# Patient Record
Sex: FEMALE | Race: WHITE
[De-identification: names, ages, dates, MRNs, and addresses within clinical notes are randomized per-mention and may not be internally consistent; named-entity substitution may affect disease eponyms.]

---

## 2018-02-10 ENCOUNTER — HOSPITAL ENCOUNTER (EMERGENCY)
Dept: HOSPITAL 47 - EC | Age: 58
Discharge: HOME | End: 2018-02-10
Payer: COMMERCIAL

## 2018-02-10 VITALS — TEMPERATURE: 98.3 F | SYSTOLIC BLOOD PRESSURE: 113 MMHG | HEART RATE: 74 BPM | DIASTOLIC BLOOD PRESSURE: 74 MMHG

## 2018-02-10 VITALS — RESPIRATION RATE: 18 BRPM

## 2018-02-10 DIAGNOSIS — Z23: ICD-10-CM

## 2018-02-10 DIAGNOSIS — S61.215A: Primary | ICD-10-CM
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
## 2018-02-10 DIAGNOSIS — Z88.0: ICD-10-CM
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
## 2018-02-10 DIAGNOSIS — Z79.899: ICD-10-CM

## 2018-02-10 DIAGNOSIS — F17.200: ICD-10-CM
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
## 2018-02-10 PROCEDURE — 12001 RPR S/N/AX/GEN/TRNK 2.5CM/<: CPT

## 2018-02-10 PROCEDURE — 99283 EMERGENCY DEPT VISIT LOW MDM: CPT

## 2018-02-10 PROCEDURE — 90471 IMMUNIZATION ADMIN: CPT

## 2018-02-10 PROCEDURE — 90715 TDAP VACCINE 7 YRS/> IM: CPT

## 2018-02-10 NOTE — ED
Wound/Laceration HPI





- General


Chief Complaint: Wound/Laceration


Stated Complaint: Finger injury


Time Seen by Provider: 02/10/18 19:39


Source: patient, RN notes reviewed, old records reviewed


Mode of arrival: ambulatory


Limitations: no limitations





- History of Present Illness


Initial Comments: 





the patient is a 57-year-old female chief complaint of the left fourth distal 

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 

a slicer.  She reports that she cut the distal tip of her fourth finger.  She 

does not know the status of her tetanus vaccine.  She has full range of motion 

noted of the finger.  No fever or chills, numbness or tingling down hand, chest 

pain, shortness breath, nausea or vomiting or headaches.  Patient reports that 

she has run sensation to the distal tip of the finger.





- Related Data


 Home Medications











 Medication  Instructions  Recorded  Confirmed


 


Celecoxib [CeleBREX] 200 mg PO BID 02/10/18 02/10/18


 


Citalopram Hydrobromide [CeleXA] 40 mg PO DAILY 02/10/18 02/10/18


 


Levothyroxine Sodium [Synthroid] 125 mcg PO DAILY 02/10/18 02/10/18











 Allergies











Allergy/AdvReac Type Severity Reaction Status Date / Time


 


Penicillins Allergy  Unknown Verified 02/10/18 19:39





   Childhood  














Review of Systems


ROS Statement: 


Those systems with pertinent positive or pertinent negative responses have been 

documented in the HPI.





ROS Other: All systems not noted in ROS Statement are negative.





Past Medical History


Past Medical History: No Reported History


History of Any Multi-Drug Resistant Organisms: None Reported


Past Surgical History: Cholecystectomy, Hysterectomy


Past Psychological History: No Psychological Hx Reported


Smoking Status: Current every day smoker


Past Alcohol Use History: Rare


Past Drug Use History: None Reported





General Exam





- General Exam Comments


Initial Comments: 





this is a well-appearing 57-year-old female.  No distress.


General: Well appearing, well nourished, in no distress. Oriented x 3, normal 

mood and affect . Ambulating without difficulty. 


Skin: Good turgor, no rash, unusual bruising or prominent lesions 


Hair: Normal texture and distribution. 


HEENT: Head: Normocephalic, atraumatic, no visible or palpable masses, 

depressions, or scaring.


Neck: Supple


Heart: No cardiomegaly or thrills; regular rate and rhythm, no murmur or gallop 


Lungs: Clear to auscultation and percussion 


Extremities: No amputations or deformities, cyanosis, edema or varicosities, 

peripheral pulses intact


patient is a partially a 1 cm flap laceration over the distal tip of the left 

fourth finger.


Musculoskeletal: Normal gait and station. 


Neurologic: CN 2-12 normal. Sensation to pain, touch, and proprioception 

normal.  No pathologic reflexes. 


Psychiatric: Oriented X3, intact recent and remote memory, judgment and insight

, normal mood and affect.


 








Limitations: no limitations





Course


 Vital Signs











  02/10/18 02/10/18





  19:21 20:46


 


Temperature 98.6 F 98.3 F


 


Pulse Rate 65 74


 


Respiratory 18 18





Rate  


 


Blood Pressure 119/68 113/74


 


O2 Sat by Pulse 98 94 L





Oximetry  














Procedures





- Laceration


  ** Laceration #1


Site: other (finger )


Size (cm): 1


Description: linear


Depth: simple, single layer


Anesthetic Used: lidocaine 1%


Anesthesia Technique: local infiltration


Amount (mls): 2


Pre-repair: wound explored, irrigated extensively


Size of Sutures: 6-0


Number of Sutures: 3


Technique: simple, interrupted


Patient Tolerated Procedure: well, no complications





Medical Decision Making





- Medical Decision Making





57-year-old female chief complaint of a left fourth finger distal tip 

laceration.  She cut on a slicer at work.  She has full range of motion.  

Laceration is superficial.  With continued to bleed.  I did numb the area and 

cleaned with iodine and normal saline.  Patient wound was closed with 3 small 

simple sutures.  She was given updated tetanus shot.  Discussed monitoring for 

any signs of infection.  Discussed wound care.  Patient understands treatment 

plan will comply.  Return parameters were discussed.





Disposition


Clinical Impression: 


 Finger laceration





Disposition: HOME SELF-CARE


Condition: Good


Instructions:  Finger Laceration (ED)


Additional Instructions: 


Please return to the emergency room in 5 to 7  days to have sutures removed. 

Please leave wound covered for the first 24-48 hours and then leave open to air 

after that time. Please use clean soap and water to clean the suture area to 

prevent scabbing over the top of your sutures. Please watch for any signs of 

infection which may include but not limited to increased pain, swelling, redness

, fever or chills. Please return to the emergency room if any signs of 

infection do occur. Please return to the emergency room for any other concerns 

or complications. 


Referrals: 


Flaco Beard MD [Primary Care Provider] - 1-2 days


Time of Disposition: 19:49